# Patient Record
Sex: MALE
[De-identification: names, ages, dates, MRNs, and addresses within clinical notes are randomized per-mention and may not be internally consistent; named-entity substitution may affect disease eponyms.]

---

## 2020-03-01 ENCOUNTER — HOSPITAL ENCOUNTER (INPATIENT)
Dept: HOSPITAL 5 - ED | Age: 35
LOS: 2 days | Discharge: HOME | DRG: 638 | End: 2020-03-03
Attending: INTERNAL MEDICINE | Admitting: INTERNAL MEDICINE
Payer: SELF-PAY

## 2020-03-01 DIAGNOSIS — Z83.3: ICD-10-CM

## 2020-03-01 DIAGNOSIS — I10: ICD-10-CM

## 2020-03-01 DIAGNOSIS — E87.1: ICD-10-CM

## 2020-03-01 DIAGNOSIS — D75.1: ICD-10-CM

## 2020-03-01 DIAGNOSIS — R65.10: ICD-10-CM

## 2020-03-01 DIAGNOSIS — E11.10: Primary | ICD-10-CM

## 2020-03-01 DIAGNOSIS — D72.829: ICD-10-CM

## 2020-03-01 LAB
ALBUMIN SERPL-MCNC: 5.4 G/DL (ref 3.9–5)
ALT SERPL-CCNC: 37 UNITS/L (ref 7–56)
BAND NEUTROPHILS # (MANUAL): 0 K/MM3
BILIRUB DIRECT SERPL-MCNC: 0.3 MG/DL (ref 0–0.2)
BILIRUB UR QL STRIP: (no result)
BLOOD UR QL VISUAL: (no result)
BUN SERPL-MCNC: 20 MG/DL (ref 9–20)
BUN SERPL-MCNC: 22 MG/DL (ref 9–20)
BUN SERPL-MCNC: 22 MG/DL (ref 9–20)
BUN/CREAT SERPL: 15 %
BUN/CREAT SERPL: 22 %
BUN/CREAT SERPL: 22 %
CALCIUM SERPL-MCNC: 8.6 MG/DL (ref 8.4–10.2)
CALCIUM SERPL-MCNC: 8.9 MG/DL (ref 8.4–10.2)
CALCIUM SERPL-MCNC: 9.5 MG/DL (ref 8.4–10.2)
HCT VFR BLD CALC: 51.5 % (ref 35.5–45.6)
HEMOLYSIS INDEX: 14
HEMOLYSIS INDEX: 6
HEMOLYSIS INDEX: 9
HGB BLD-MCNC: 17.8 GM/DL (ref 11.8–15.2)
HYALINE CASTS #/AREA URNS LPF: 1 /LPF
MCHC RBC AUTO-ENTMCNC: 35 % (ref 32–34)
MCV RBC AUTO: 90 FL (ref 84–94)
MUCOUS THREADS #/AREA URNS HPF: (no result) /HPF
MYELOCYTES # (MANUAL): 0 K/MM3
PH UR STRIP: 5 [PH] (ref 5–7)
PLATELET # BLD: 420 K/MM3 (ref 140–440)
PROMYELOCYTES # (MANUAL): 0 K/MM3
RBC # BLD AUTO: 5.7 M/MM3 (ref 3.65–5.03)
RBC #/AREA URNS HPF: 2 /HPF (ref 0–6)
TOTAL CELLS COUNTED BLD: 100
UROBILINOGEN UR-MCNC: < 2 MG/DL (ref ?–2)
WBC #/AREA URNS HPF: 1 /HPF (ref 0–6)

## 2020-03-01 PROCEDURE — 85007 BL SMEAR W/DIFF WBC COUNT: CPT

## 2020-03-01 PROCEDURE — 85025 COMPLETE CBC W/AUTO DIFF WBC: CPT

## 2020-03-01 PROCEDURE — 83735 ASSAY OF MAGNESIUM: CPT

## 2020-03-01 PROCEDURE — 82805 BLOOD GASES W/O2 SATURATION: CPT

## 2020-03-01 PROCEDURE — 36415 COLL VENOUS BLD VENIPUNCTURE: CPT

## 2020-03-01 PROCEDURE — 82962 GLUCOSE BLOOD TEST: CPT

## 2020-03-01 PROCEDURE — 80076 HEPATIC FUNCTION PANEL: CPT

## 2020-03-01 PROCEDURE — 83690 ASSAY OF LIPASE: CPT

## 2020-03-01 PROCEDURE — 87040 BLOOD CULTURE FOR BACTERIA: CPT

## 2020-03-01 PROCEDURE — 84100 ASSAY OF PHOSPHORUS: CPT

## 2020-03-01 PROCEDURE — 80048 BASIC METABOLIC PNL TOTAL CA: CPT

## 2020-03-01 PROCEDURE — 93005 ELECTROCARDIOGRAM TRACING: CPT

## 2020-03-01 PROCEDURE — 81001 URINALYSIS AUTO W/SCOPE: CPT

## 2020-03-01 PROCEDURE — 93010 ELECTROCARDIOGRAM REPORT: CPT

## 2020-03-01 PROCEDURE — 82140 ASSAY OF AMMONIA: CPT

## 2020-03-01 PROCEDURE — 74177 CT ABD & PELVIS W/CONTRAST: CPT

## 2020-03-01 PROCEDURE — 83036 HEMOGLOBIN GLYCOSYLATED A1C: CPT

## 2020-03-01 RX ADMIN — INSULIN HUMAN SCH MLS/HR: 100 INJECTION, SOLUTION PARENTERAL at 22:00

## 2020-03-01 RX ADMIN — DEXTROSE AND SODIUM CHLORIDE SCH MLS/HR: 5; .9 INJECTION, SOLUTION INTRAVENOUS at 21:51

## 2020-03-01 RX ADMIN — Medication SCH ML: at 21:52

## 2020-03-01 RX ADMIN — FAMOTIDINE SCH MG: 10 INJECTION, SOLUTION INTRAVENOUS at 22:19

## 2020-03-01 NOTE — EVENT NOTE
ED Screening Note


Date of service: 03/01/20


Time: 16:30


ED Screening Note: 





c/o mid abdominal pain x yesterday


recent diagnosis of diabetes, not currently on medication


denies alcohol 





This initial assessment/diagnostic orders/clinical plan/treatment(s) is/are 

subject to change based on patients health status, clinical progression and re-

assessment by fellow clinical providers in the ED. Further treatment and workup 

at subsequent clinical providers discretion. Patient/guardian urged not to elope

from the ED as their condition may be serious if not clinically assessed and 

managed. 





Initial orders include: 


labs


CT

## 2020-03-01 NOTE — HISTORY AND PHYSICAL REPORT
History of Present Illness


Date of examination: 03/01/20


Date of admission: 


03/01/20 18:12





History of present illness: 





36-year-old male with a past medical history non-insulin-dependent diabetes 

presents to the hospital planing of hyperglycemia, nausea, vomiting, and 

generalized abdominal pain for last 3 days.  Patient has been diagnosed with 

diabetes 1 year ago and is from [place].  3 weeks ago he ran out of his 

previously prescribed medication from [place].  Yesterday he went to an urgent 

care clinic with complaints of nausea and vomiting.  He was told that his sugar 

was high and he had a UTI.  He was started on metformin 850 mg twice daily and 

an unknown antibiotic.  He was told if his nausea vomiting continued he would 

need to go to the ER.  Patient reports polyuria up until about 2 days ago..  

Denies previous history of DKA or ICU admission.  Patient complains of 

generalized burning pain with intermittent sharpness that is worse with 

palpation.  He denies diarrhea, fever, history of previous abd surgeries.





- Related Data


                                    Allergies











Allergy/AdvReac Type Severity Reaction Status Date / Time


 


No Known Allergies Allergy   Verified 03/01/20 16:22














 Review of Systems 


ROS: 


Stated complaint: STOMACH PAIN  VOMITING


Other details as noted in HPI





Comment: All other systems reviewed and negative








- Past Medical History


Previous Medical History?: Yes


Hx Diabetes: Yes





- Surgical History


Past Surgical History?: No





- Social History


Smoking Status: Never Smoker


Substance Use Type: None








Medications and Allergies


                                    Allergies











Allergy/AdvReac Type Severity Reaction Status Date / Time


 


No Known Allergies Allergy   Verified 03/01/20 16:22











                                Home Medications











 Medication  Instructions  Recorded  Confirmed  Last Taken  Type


 


metFORMIN [Glucophage] 850 mg PO 03/01/20  Unknown History











Active Meds: 


Active Medications





Dextrose (D50w (25gm) Syringe)  0 ml IV Q30MIN PRN; Protocol


   PRN Reason: Hypoglycemia


Insulin Human Regular 100 (units/ Sodium Chloride)  100 mls @ 1 mls/hr IV TITR 

KAREN; Protocol


   Last Titration: 03/01/20 20:15 Dose:  5 units/hr, 5 mls/hr


   Documented by: 











Exam





- Constitutional


Vitals: 


                                        











Temp Pulse Resp BP Pulse Ox


 


 98.7 F   106 H  18   120/60   99 


 


 03/01/20 16:31  03/01/20 19:41  03/01/20 19:41  03/01/20 19:41  03/01/20 19:41














Results





- Labs


CBC & Chem 7: 


                                 03/01/20 16:37





                                 03/01/20 20:09


Labs: 


                             Laboratory Last Values











WBC  15.7 K/mm3 (4.5-11.0)  H  03/01/20  16:37    


 


RBC  5.70 M/mm3 (3.65-5.03)  H  03/01/20  16:37    


 


Hgb  17.8 gm/dl (11.8-15.2)  H  03/01/20  16:37    


 


Hct  51.5 % (35.5-45.6)  H  03/01/20  16:37    


 


MCV  90 fl (84-94)   03/01/20  16:37    


 


MCH  31 pg (28-32)   03/01/20  16:37    


 


MCHC  35 % (32-34)  H  03/01/20  16:37    


 


RDW  13.3 % (13.2-15.2)   03/01/20  16:37    


 


Plt Count  420 K/mm3 (140-440)   03/01/20  16:37    


 


Add Manual Diff  Complete   03/01/20  16:37    


 


Total Counted  100   03/01/20  16:37    


 


Seg Neutrophils %  Np   03/01/20  16:37    


 


Seg Neuts % (Manual)  90.0 % (40.0-70.0)  H  03/01/20  16:37    


 


Band Neutrophils %  0 %  03/01/20  16:37    


 


Lymphocytes % (Manual)  10.0 % (13.4-35.0)  L  03/01/20  16:37    


 


Reactive Lymphs % (Man)  0 %  03/01/20  16:37    


 


Monocytes % (Manual)  0 % (0.0-7.3)   03/01/20  16:37    


 


Eosinophils % (Manual)  0 % (0.0-4.3)   03/01/20  16:37    


 


Basophils % (Manual)  0 % (0.0-1.8)   03/01/20  16:37    


 


Metamyelocytes %  0 %  03/01/20  16:37    


 


Myelocytes %  0 %  03/01/20  16:37    


 


Promyelocytes %  0 %  03/01/20  16:37    


 


Blast Cells %  0 %  03/01/20  16:37    


 


Nucleated RBC %  Not Reportable   03/01/20  16:37    


 


Seg Neutrophils # Man  14.1 K/mm3 (1.8-7.7)  H  03/01/20  16:37    


 


Band Neutrophils #  0.0 K/mm3  03/01/20  16:37    


 


Lymphocytes # (Manual)  1.6 K/mm3 (1.2-5.4)   03/01/20  16:37    


 


Abs React Lymphs (Man)  0.0 K/mm3  03/01/20  16:37    


 


Monocytes # (Manual)  0.0 K/mm3 (0.0-0.8)   03/01/20  16:37    


 


Eosinophils # (Manual)  0.0 K/mm3 (0.0-0.4)   03/01/20  16:37    


 


Basophils # (Manual)  0.0 K/mm3 (0.0-0.1)   03/01/20  16:37    


 


Metamyelocytes #  0.0 K/mm3  03/01/20  16:37    


 


Myelocytes #  0.0 K/mm3  03/01/20  16:37    


 


Promyelocytes #  0.0 K/mm3  03/01/20  16:37    


 


Blast Cells #  0.0 K/mm3  03/01/20  16:37    


 


WBC Morphology  Not Reportable   03/01/20  16:37    


 


Hypersegmented Neuts  Not Reportable   03/01/20  16:37    


 


Hyposegmented Neuts  Not Reportable   03/01/20  16:37    


 


Hypogranular Neuts  Not Reportable   03/01/20  16:37    


 


Smudge Cells  Not Reportable   03/01/20  16:37    


 


Toxic Granulation  Not Reportable   03/01/20  16:37    


 


Toxic Vacuolation  Not Reportable   03/01/20  16:37    


 


Dohle Bodies  Not Reportable   03/01/20  16:37    


 


Pelger-Huet Anomaly  Not Reportable   03/01/20  16:37    


 


Elizabet Rods  Not Reportable   03/01/20  16:37    


 


Platelet Estimate  Consistent w auto   03/01/20  16:37    


 


Clumped Platelets  Not Reportable   03/01/20  16:37    


 


Plt Clumps, EDTA  Not Reportable   03/01/20  16:37    


 


Large Platelets  1+   03/01/20  16:37    


 


Giant Platelets  Rare   03/01/20  16:37    


 


Platelet Satelliting  Not Reportable   03/01/20  16:37    


 


Plt Morphology Comment  Not Reportable   03/01/20  16:37    


 


RBC Morphology  Not Reportable   03/01/20  16:37    


 


Dimorphic RBCs  Not Reportable   03/01/20  16:37    


 


Polychromasia  Not Reportable   03/01/20  16:37    


 


Hypochromasia  Not Reportable   03/01/20  16:37    


 


Poikilocytosis  Not Reportable   03/01/20  16:37    


 


Anisocytosis  Few   03/01/20  16:37    


 


Microcytosis  Not Reportable   03/01/20  16:37    


 


Macrocytosis  Not Reportable   03/01/20  16:37    


 


Spherocytes  Not Reportable   03/01/20  16:37    


 


Pappenheimer Bodies  Not Reportable   03/01/20  16:37    


 


Sickle Cells  Not Reportable   03/01/20  16:37    


 


Target Cells  Not Reportable   03/01/20  16:37    


 


Tear Drop Cells  Not Reportable   03/01/20  16:37    


 


Ovalocytes  Not Reportable   03/01/20  16:37    


 


Stomatocytes  Few   03/01/20  16:37    


 


Helmet Cells  Not Reportable   03/01/20  16:37    


 


Bruce-Verplanck Bodies  Not Reportable   03/01/20  16:37    


 


Cabot Rings  Not Reportable   03/01/20  16:37    


 


Luigi Cells  Not Reportable   03/01/20  16:37    


 


Bite Cells  Not Reportable   03/01/20  16:37    


 


Crenated Cell  Not Reportable   03/01/20  16:37    


 


Elliptocytes  Not Reportable   03/01/20  16:37    


 


Acanthocytes (Spur)  Not Reportable   03/01/20  16:37    


 


Rouleaux  Not Reportable   03/01/20  16:37    


 


Hemoglobin C Crystals  Not Reportable   03/01/20  16:37    


 


Schistocytes  Not Reportable   03/01/20  16:37    


 


Malaria parasites  Not Reportable   03/01/20  16:37    


 


Kyaw Bodies  Not Reportable   03/01/20  16:37    


 


Hem Pathologist Commnt  No   03/01/20  16:37    


 


VBG pH  7.088  (7.320-7.420)  L*  03/01/20  16:37    


 


Sodium  131 mmol/L (137-145)  L  03/01/20  20:09    


 


Potassium  3.8 mmol/L (3.6-5.0)   03/01/20  20:09    


 


Chloride  96.7 mmol/L ()  L  03/01/20  20:09    


 


Carbon Dioxide  5 mmol/L (22-30)  L*  03/01/20  16:37    


 


Anion Gap  42 mmol/L  03/01/20  16:37    


 


BUN  22 mg/dL (9-20)  H  03/01/20  20:09    


 


Creatinine  1.0 mg/dL (0.8-1.5)   03/01/20  20:09    


 


Estimated GFR  > 60 ml/min  03/01/20  20:09    


 


BUN/Creatinine Ratio  22 %  03/01/20  20:09    


 


Glucose  352 mg/dL ()  H  03/01/20  20:09    


 


POC Glucose  273  ()  H  03/01/20  20:15    


 


Lactic Acid  2.60 mmol/L (0.7-2.0)  H*  03/01/20  Unknown


 


Calcium  8.9 mg/dL (8.4-10.2)   03/01/20  20:09    


 


Phosphorus  5.50 mg/dL (2.5-4.5)  H  03/01/20  Unknown


 


Magnesium  2.40 mg/dL (1.7-2.3)  H  03/01/20  Unknown


 


Total Bilirubin  0.80 mg/dL (0.1-1.2)   03/01/20  16:37    


 


Direct Bilirubin  0.3 mg/dL (0-0.2)  H  03/01/20  16:37    


 


Indirect Bilirubin  0.5 mg/dL  03/01/20  16:37    


 


AST  13 units/L (5-40)   03/01/20  16:37    


 


ALT  37 units/L (7-56)   03/01/20  16:37    


 


Alkaline Phosphatase  167 units/L ()  H  03/01/20  16:37    


 


Total Protein  8.7 g/dL (6.3-8.2)  H  03/01/20  16:37    


 


Albumin  5.4 g/dL (3.9-5)  H  03/01/20  16:37    


 


Albumin/Globulin Ratio  1.6 %  03/01/20  16:37    


 


Lipase  683 units/L (13-60)  H  03/01/20  16:37    


 


Urine Color  Straw  (Yellow)   03/01/20  20:00    


 


Urine Turbidity  Clear  (Clear)   03/01/20  20:00    


 


Urine pH  5.0  (5.0-7.0)   03/01/20  20:00    


 


Ur Specific Gravity  1.029  (1.003-1.030)   03/01/20  20:00    


 


Urine Protein  30 mg/dl mg/dL (Negative)   03/01/20  20:00    


 


Urine Glucose (UA)  >=500 mg/dL (Negative)   03/01/20  20:00    


 


Urine Ketones  80 mg/dL (Negative)   03/01/20  20:00    


 


Urine Blood  Sm  (Negative)   03/01/20  20:00    


 


Urine Nitrite  Neg  (Negative)   03/01/20  20:00    


 


Urine Bilirubin  Neg  (Negative)   03/01/20  20:00    


 


Urine Urobilinogen  < 2.0 mg/dL (<2.0)   03/01/20  20:00    


 


Ur Leukocyte Esterase  Neg  (Negative)   03/01/20  20:00    


 


Urine WBC (Auto)  1.0 /HPF (0.0-6.0)   03/01/20  20:00    


 


Urine RBC (Auto)  2.0 /HPF (0.0-6.0)   03/01/20  20:00    


 


Hyaline Casts  1 /LPF  03/01/20  20:00    


 


Urine Mucus  Few /HPF  03/01/20  20:00

## 2020-03-01 NOTE — CAT SCAN REPORT
CT ABDOMEN AND PELVIS WITH IV CONTRAST



INDICATION: Generalized abdominal pain and new onset diabetes. 



TECHNIQUE: Following the administration of intravenous contrast, multiple axial CT images of the abdo
men and pelvis were acquired. Sagittal and coronal reformats were obtained.  All CT performed at this
 facility utilize dose reduction techniques including automated exposure control, iterative reconstru
ction and weight based dosing when appropriate to reduce patient radiation dose to as low as reasonab
ly achievable.  



COMPARISON: None



FINDINGS:

Limited imaging of the bilateral lung bases demonstrates no acute abnormality.



Abdomen: There is moderate fluid-filled distention of the stomach. The liver, spleen, pancreas, bilat
eral adrenal glands and bilateral kidneys show no evidence of acute abnormality. There is no evidence
 of bowel obstruction or free fluid. The appendix is visualized and appears normal.



Pelvis: No free fluid is seen within the pelvis. The urinary bladder appears normal.



Bones and Soft Tissues: No significant abnormality.



IMPRESSION:

1. Moderate fluid-filled distention of the stomach which is nonspecific but may suggest gastritis.



Signer Name: Miley Watts MD 

Signed: 3/1/2020 5:59 PM

Workstation Name: [id number]

## 2020-03-01 NOTE — EMERGENCY DEPARTMENT REPORT
ED Abdominal Pain HPI





- General


Chief Complaint: Abdominal Pain


Stated Complaint: STOMACH PAIN  VOMITING


Time Seen by Provider: 03/01/20 16:27


Source: patient


Mode of arrival: Ambulatory


Limitations: No Limitations





- History of Present Illness


Initial Comments: 





36-year-old male with a past medical history non-insulin-dependent diabetes 

presents to the hospital planing of hyperglycemia, nausea, vomiting, and 

generalized abdominal pain for last 3 days.  Patient has been diagnosed with 

diabetes 1 year ago and is from [place].  3 weeks ago he ran out of his 

previously prescribed medication from [place].  Yesterday he went to an urgent 

care clinic with complaints of nausea and vomiting.  He was told that his sugar 

was high and he had a UTI.  He was started on metformin 850 mg twice daily and 

an unknown antibiotic.  He was told if his nausea vomiting continued he would 

need to go to the ER.  Patient reports polyuria up until about 2 days ago..  

Denies previous history of DKA or ICU admission.  Patient complains of 

generalized burning pain with intermittent sharpness that is worse with 

palpation.  He denies diarrhea, fever, history of previous abd surgeries.





- Related Data


                                Home Medications











 Medication  Instructions  Recorded  Confirmed  Last Taken


 


Lisinopril [Zestril] 5 mg PO ONCE 03/01/20 03/01/20 Unknown


 


metFORMIN [Glucophage] 850 mg PO ONCE 03/01/20 03/01/20 Unknown











                                    Allergies











Allergy/AdvReac Type Severity Reaction Status Date / Time


 


No Known Allergies Allergy   Verified 03/01/20 16:22














ED Review of Systems


ROS: 


Stated complaint: STOMACH PAIN  VOMITING


Other details as noted in HPI





Comment: All other systems reviewed and negative





ED Past Medical Hx





- Past Medical History


Previous Medical History?: Yes


Hx Diabetes: Yes





- Surgical History


Past Surgical History?: No





- Social History


Smoking Status: Never Smoker


Substance Use Type: None





- Medications


Home Medications: 


                                Home Medications











 Medication  Instructions  Recorded  Confirmed  Last Taken  Type


 


Lisinopril [Zestril] 5 mg PO ONCE 03/01/20 03/01/20 Unknown History


 


metFORMIN [Glucophage] 850 mg PO ONCE 03/01/20 03/01/20 Unknown History














ED Physical Exam





- General


Limitations: No Limitations





- Other


Other exam information: 





General: No acute distress


Head: Atraumatic


Eyes: normal appearance


ENT: Dry mucous membrane


Neck: Normal appearance, no midline tenderness


Chest: Clear to auscultation bilaterally


CV: Tachycardic regular rhythm


Abdomen: Soft, normal bowel sounds, right lower quadrant and epigastric 

tenderness, no rebound or guarding


Back: Normal inspection


Extremity: Normal inspection, full range of motion


Neuro: Alert O x 3, no facial asymmetry, speech clear, no gross motor sensory 

deficit


Psych: Appropriate behavior


Skin: No rash





ED Course


                                   Vital Signs











  03/01/20 03/01/20 03/01/20





  16:31 17:30 18:09


 


Temperature 98.7 F  


 


Pulse Rate 137 H 107 H 105 H


 


Respiratory 18 24 24





Rate   


 


Blood Pressure 145/86  


 


Blood Pressure  129/75 148/69





[Right]   


 


O2 Sat by Pulse 100 99 98





Oximetry   














  03/01/20





  19:41


 


Temperature 


 


Pulse Rate 106 H


 


Respiratory 18





Rate 


 


Blood Pressure 


 


Blood Pressure 120/60





[Right] 


 


O2 Sat by Pulse 99





Oximetry 














ED Medical Decision Making





- Lab Data


Result diagrams: 


                                 03/01/20 16:37





                                 03/01/20 21:19








                                   Lab Results











  03/01/20 03/01/20 03/01/20 Range/Units





  16:37 16:37 16:37 


 


WBC   15.7 H   (4.5-11.0)  K/mm3


 


RBC   5.70 H   (3.65-5.03)  M/mm3


 


Hgb   17.8 H   (11.8-15.2)  gm/dl


 


Hct   51.5 H   (35.5-45.6)  %


 


MCV   90   (84-94)  fl


 


MCH   31   (28-32)  pg


 


MCHC   35 H   (32-34)  %


 


RDW   13.3   (13.2-15.2)  %


 


Plt Count   420   (140-440)  K/mm3


 


Add Manual Diff   Complete   


 


Total Counted   100   


 


Seg Neutrophils %   Np   


 


Seg Neuts % (Manual)   90.0 H   (40.0-70.0)  %


 


Band Neutrophils %   0   %


 


Lymphocytes % (Manual)   10.0 L   (13.4-35.0)  %


 


Reactive Lymphs % (Man)   0   %


 


Monocytes % (Manual)   0   (0.0-7.3)  %


 


Eosinophils % (Manual)   0   (0.0-4.3)  %


 


Basophils % (Manual)   0   (0.0-1.8)  %


 


Metamyelocytes %   0   %


 


Myelocytes %   0   %


 


Promyelocytes %   0   %


 


Blast Cells %   0   %


 


Nucleated RBC %   Not Reportable   


 


Seg Neutrophils # Man   14.1 H   (1.8-7.7)  K/mm3


 


Band Neutrophils #   0.0   K/mm3


 


Lymphocytes # (Manual)   1.6   (1.2-5.4)  K/mm3


 


Abs React Lymphs (Man)   0.0   K/mm3


 


Monocytes # (Manual)   0.0   (0.0-0.8)  K/mm3


 


Eosinophils # (Manual)   0.0   (0.0-0.4)  K/mm3


 


Basophils # (Manual)   0.0   (0.0-0.1)  K/mm3


 


Metamyelocytes #   0.0   K/mm3


 


Myelocytes #   0.0   K/mm3


 


Promyelocytes #   0.0   K/mm3


 


Blast Cells #   0.0   K/mm3


 


WBC Morphology   Not Reportable   


 


Hypersegmented Neuts   Not Reportable   


 


Hyposegmented Neuts   Not Reportable   


 


Hypogranular Neuts   Not Reportable   


 


Smudge Cells   Not Reportable   


 


Toxic Granulation   Not Reportable   


 


Toxic Vacuolation   Not Reportable   


 


Dohle Bodies   Not Reportable   


 


Pelger-Huet Anomaly   Not Reportable   


 


Eliz[place]et Rods   Not Reportable   


 


Platelet Estimate   Consistent w auto   


 


Clumped Platelets   Not Reportable   


 


Plt Clumps, EDTA   Not Reportable   


 


Large Platelets   1+   


 


Giant Platelets   Rare   


 


Platelet Satelliting   Not Reportable   


 


Plt Morphology Comment   Not Reportable   


 


RBC Morphology   Not Reportable   


 


Dimorphic RBCs   Not Reportable   


 


Polychromasia   Not Reportable   


 


Hypochromasia   Not Reportable   


 


Poikilocytosis   Not Reportable   


 


Anisocytosis   Few   


 


Microcytosis   Not Reportable   


 


Macrocytosis   Not Reportable   


 


Spherocytes   Not Reportable   


 


Pappenheimer Bodies   Not Reportable   


 


Sickle Cells   Not Reportable   


 


Target Cells   Not Reportable   


 


Tear Drop Cells   Not Reportable   


 


Ovalocytes   Not Reportable   


 


Stomatocytes   Few   


 


Helmet Cells   Not Reportable   


 


[place] Bodies   Not Reportable   


 


Cabot Rings   Not Reportable   


 


Luigi Cells   Not Reportable   


 


Bite Cells   Not Reportable   


 


Crenated Cell   Not Reportable   


 


Elliptocytes   Not Reportable   


 


Acanthocytes (Spur)   Not Reportable   


 


Rouleaux   Not Reportable   


 


Hemoglobin C Crystals   Not Reportable   


 


Schistocytes   Not Reportable   


 


Malaria parasites   Not Reportable   


 


[place] Bodies   Not Reportable   


 


Hem Pathologist Commnt   No   


 


VBG pH     (7.320-7.420)  


 


Sodium    127 L  (137-145)  mmol/L


 


Potassium    4.2  (3.6-5.0)  mmol/L


 


Chloride    84.0 L  ()  mmol/L


 


Carbon Dioxide    5 L*  (22-30)  mmol/L


 


Anion Gap    42  mmol/L


 


BUN    22 H  (9-20)  mg/dL


 


Creatinine    1.5  (0.8-1.5)  mg/dL


 


Estimated GFR    54  ml/min


 


BUN/Creatinine Ratio    15  %


 


Glucose    530 H*  ()  mg/dL


 


POC Glucose  426 H    ()  


 


Lactic Acid     (0.7-2.0)  mmol/L


 


Calcium    9.5  (8.4-10.2)  mg/dL


 


Phosphorus     (2.5-4.5)  mg/dL


 


Magnesium     (1.7-2.3)  mg/dL


 


Total Bilirubin    0.80  (0.1-1.2)  mg/dL


 


Direct Bilirubin    0.3 H  (0-0.2)  mg/dL


 


Indirect Bilirubin    0.5  mg/dL


 


AST    13  (5-40)  units/L


 


ALT    37  (7-56)  units/L


 


Alkaline Phosphatase    167 H  ()  units/L


 


Total Protein    8.7 H  (6.3-8.2)  g/dL


 


Albumin    5.4 H  (3.9-5)  g/dL


 


Albumin/Globulin Ratio    1.6  %


 


Lipase     (13-60)  units/L














  03/01/20 03/01/20 03/01/20 Range/Units





  16:37 16:37 Unknown 


 


WBC     (4.5-11.0)  K/mm3


 


RBC     (3.65-5.03)  M/mm3


 


Hgb     (11.8-15.2)  gm/dl


 


Hct     (35.5-45.6)  %


 


MCV     (84-94)  fl


 


MCH     (28-32)  pg


 


MCHC     (32-34)  %


 


RDW     (13.2-15.2)  %


 


Plt Count     (140-440)  K/mm3


 


Add Manual Diff     


 


Total Counted     


 


Seg Neutrophils %     


 


Seg Neuts % (Manual)     (40.0-70.0)  %


 


Band Neutrophils %     %


 


Lymphocytes % (Manual)     (13.4-35.0)  %


 


Reactive Lymphs % (Man)     %


 


Monocytes % (Manual)     (0.0-7.3)  %


 


Eosinophils % (Manual)     (0.0-4.3)  %


 


Basophils % (Manual)     (0.0-1.8)  %


 


Metamyelocytes %     %


 


Myelocytes %     %


 


Promyelocytes %     %


 


Blast Cells %     %


 


Nucleated RBC %     


 


Seg Neutrophils # Man     (1.8-7.7)  K/mm3


 


Band Neutrophils #     K/mm3


 


Lymphocytes # (Manual)     (1.2-5.4)  K/mm3


 


Abs React Lymphs (Man)     K/mm3


 


Monocytes # (Manual)     (0.0-0.8)  K/mm3


 


Eosinophils # (Manual)     (0.0-0.4)  K/mm3


 


Basophils # (Manual)     (0.0-0.1)  K/mm3


 


Metamyelocytes #     K/mm3


 


Myelocytes #     K/mm3


 


Promyelocytes #     K/mm3


 


Blast Cells #     K/mm3


 


WBC Morphology     


 


Hypersegmented Neuts     


 


Hyposegmented Neuts     


 


Hypogranular Neuts     


 


Smudge Cells     


 


Toxic Granulation     


 


Toxic Vacuolation     


 


Dohle Bodies     


 


Pelger-Huet Anomaly     


 


Elizabet Rods     


 


Platelet Estimate     


 


Clumped Platelets     


 


Plt Clumps, EDTA     


 


Large Platelets     


 


Giant Platelets     


 


Platelet Satelliting     


 


Plt Morphology Comment     


 


RBC Morphology     


 


Dimorphic RBCs     


 


Polychromasia     


 


Hypochromasia     


 


Poikilocytosis     


 


Anisocytosis     


 


Microcytosis     


 


Macrocytosis     


 


Spherocytes     


 


Pappenheimer Bodies     


 


Sickle Cells     


 


Target Cells     


 


Tear Drop Cells     


 


Ovalocytes     


 


Stomatocytes     


 


Helmet Cells     


 


[place] Bodies     


 


Cabot Rings     


 


Luigi Cells     


 


Bite Cells     


 


Crenated Cell     


 


Elliptocytes     


 


Acanthocytes (Spur)     


 


Rouleaux     


 


Hemoglobin C Crystals     


 


Schistocytes     


 


Malaria parasites     


 


Kyaw Bodies     


 


Hem Pathologist Commnt     


 


VBG pH  7.088 L*    (7.320-7.420)  


 


Sodium     (137-145)  mmol/L


 


Potassium     (3.6-5.0)  mmol/L


 


Chloride     ()  mmol/L


 


Carbon Dioxide     (22-30)  mmol/L


 


Anion Gap     mmol/L


 


BUN     (9-20)  mg/dL


 


Creatinine     (0.8-1.5)  mg/dL


 


Estimated GFR     ml/min


 


BUN/Creatinine Ratio     %


 


Glucose     ()  mg/dL


 


POC Glucose     ()  


 


Lactic Acid    2.60 H*  (0.7-2.0)  mmol/L


 


Calcium     (8.4-10.2)  mg/dL


 


Phosphorus     (2.5-4.5)  mg/dL


 


Magnesium     (1.7-2.3)  mg/dL


 


Total Bilirubin     (0.1-1.2)  mg/dL


 


Direct Bilirubin     (0-0.2)  mg/dL


 


Indirect Bilirubin     mg/dL


 


AST     (5-40)  units/L


 


ALT     (7-56)  units/L


 


Alkaline Phosphatase     ()  units/L


 


Total Protein     (6.3-8.2)  g/dL


 


Albumin     (3.9-5)  g/dL


 


Albumin/Globulin Ratio     %


 


Lipase   683 H   (13-60)  units/L














  03/01/20 Range/Units





  Unknown 


 


WBC   (4.5-11.0)  K/mm3


 


RBC   (3.65-5.03)  M/mm3


 


Hgb   (11.8-15.2)  gm/dl


 


Hct   (35.5-45.6)  %


 


MCV   (84-94)  fl


 


MCH   (28-32)  pg


 


MCHC   (32-34)  %


 


RDW   (13.2-15.2)  %


 


Plt Count   (140-440)  K/mm3


 


Add Manual Diff   


 


Total Counted   


 


Seg Neutrophils %   


 


Seg Neuts % (Manual)   (40.0-70.0)  %


 


Band Neutrophils %   %


 


Lymphocytes % (Manual)   (13.4-35.0)  %


 


Reactive Lymphs % (Man)   %


 


Monocytes % (Manual)   (0.0-7.3)  %


 


Eosinophils % (Manual)   (0.0-4.3)  %


 


Basophils % (Manual)   (0.0-1.8)  %


 


Metamyelocytes %   %


 


Myelocytes %   %


 


Promyelocytes %   %


 


Blast Cells %   %


 


Nucleated RBC %   


 


Seg Neutrophils # Man   (1.8-7.7)  K/mm3


 


Band Neutrophils #   K/mm3


 


Lymphocytes # (Manual)   (1.2-5.4)  K/mm3


 


Abs React Lymphs (Man)   K/mm3


 


Monocytes # (Manual)   (0.0-0.8)  K/mm3


 


Eosinophils # (Manual)   (0.0-0.4)  K/mm3


 


Basophils # (Manual)   (0.0-0.1)  K/mm3


 


Metamyelocytes #   K/mm3


 


Myelocytes #   K/mm3


 


Promyelocytes #   K/mm3


 


Blast Cells #   K/mm3


 


WBC Morphology   


 


Hypersegmented Neuts   


 


Hyposegmented Neuts   


 


Hypogranular Neuts   


 


Smudge Cells   


 


Toxic Granulation   


 


Toxic Vacuolation   


 


Dohle Bodies   


 


Pelger-Huet Anomaly   


 


Elizabet Rods   


 


Platelet Estimate   


 


Clumped Platelets   


 


Plt Clumps, EDTA   


 


Large Platelets   


 


Giant Platelets   


 


Platelet Satelliting   


 


Plt Morphology Comment   


 


RBC Morphology   


 


Dimorphic RBCs   


 


Polychromasia   


 


Hypochromasia   


 


Poikilocytosis   


 


Anisocytosis   


 


Microcytosis   


 


Macrocytosis   


 


Spherocytes   


 


Pappenheimer Bodies   


 


Sickle Cells   


 


Target Cells   


 


Tear Drop Cells   


 


Ovalocytes   


 


Stomatocytes   


 


Helmet Cells   


 


[place] Bodies   


 


Cabot Rings   


 


[place] Cells   


 


Bite Cells   


 


Crenated Cell   


 


Elliptocytes   


 


Acanthocytes (Spur)   


 


Rouleaux   


 


Hemoglobin C Crystals   


 


Schistocytes   


 


Malaria parasites   


 


[place] Bodies   


 


Hem Pathologist Commnt   


 


VBG pH   (7.320-7.420)  


 


Sodium   (137-145)  mmol/L


 


Potassium   (3.6-5.0)  mmol/L


 


Chloride   ()  mmol/L


 


Carbon Dioxide   (22-30)  mmol/L


 


Anion Gap   mmol/L


 


BUN   (9-20)  mg/dL


 


Creatinine   (0.8-1.5)  mg/dL


 


Estimated GFR   ml/min


 


BUN/Creatinine Ratio   %


 


Glucose   ()  mg/dL


 


POC Glucose   ()  


 


Lactic Acid   (0.7-2.0)  mmol/L


 


Calcium   (8.4-10.2)  mg/dL


 


Phosphorus  5.50 H  (2.5-4.5)  mg/dL


 


Magnesium  2.40 H  (1.7-2.3)  mg/dL


 


Total Bilirubin   (0.1-1.2)  mg/dL


 


Direct Bilirubin   (0-0.2)  mg/dL


 


Indirect Bilirubin   mg/dL


 


AST   (5-40)  units/L


 


ALT   (7-56)  units/L


 


Alkaline Phosphatase   ()  units/L


 


Total Protein   (6.3-8.2)  g/dL


 


Albumin   (3.9-5)  g/dL


 


Albumin/Globulin Ratio   %


 


Lipase   (13-60)  units/L














- EKG Data


-: EKG Interpreted by Me


EKG shows normal: sinus rhythm, intervals (qtc 489)


Rate: tachycardia (112)





- Radiology Data


Radiology results: report reviewed





CT ABDOMEN AND PELVIS WITH IV CONTRAST INDICATION: Generalized abdominal pain 

and new onset diabetes. TECHNIQUE: Following the administration of intravenous 

contrast, multiple axial CT images of the abdomen and pelvis were acquired. 

Sagittal and coronal reformats were obtained. All CT performed at this facility 

utilize dose reduction techniques including automated exposure control, 

iterative reconstruction and weight based dosing when appropriate to reduce 

patient radiation dose to as low as reasonably achievable. COMPARISON: None 

FINDINGS: Limited imaging of the bilateral lung bases demonstrates no acute ab

normality. Abdomen: There is moderate fluid-filled distention of the stomach. 

The liver, spleen, pancreas, bilateral adrenal glands and bilateral kidneys show

 no evidence of acute abnormality. There is no evidence of bowel obstruction or 

free fluid. The appendix is visualized and appears normal. Pelvis: No free fluid

 is seen within the pelvis. The urinary bladder appears normal. Bones and Soft 

Tissues: No significant abnormality. IMPRESSION: 1. Moderate fluid-filled 

distention of the stomach which is nonspecific but may suggest gastritis. 





- Medical Decision Making





Patient presents with signs and symptoms of DKA with significant anion gap 

metabolic acidosis and nausea vomiting.  CT and pelvis suggestive of gastritis. 

 Patient treated with normal saline, insulin drip, Zofran, Pepcid, and morphine 

in the ED.  At time of disposition awaiting urine collection to determine if IV 

antibiotics is indicated.





Case discussed with hospitalist for admission and ICU orders placed





UA neg for infection





- Differential Diagnosis


DKA, HHNK, pancreatitis, dehydration, sepsis, UTI


Critical Care Time: Yes


Critical care time in (mins) excluding proc time.: 35


Critical care attestation.: 


If time is entered above; I have spent that time in minutes in the direct care 

of this critically ill patient, excluding procedure time.








ED Disposition


Clinical Impression: 


 DKA (diabetic ketoacidoses)





Disposition: DC-09 OP ADMIT IP TO THIS HOSP


Is pt being admited?: Yes


Condition: Stable


Time of Disposition: 18:09 (Dr Brock/hosp)

## 2020-03-02 LAB
BUN SERPL-MCNC: 15 MG/DL (ref 9–20)
BUN SERPL-MCNC: 17 MG/DL (ref 9–20)
BUN SERPL-MCNC: 19 MG/DL (ref 9–20)
BUN/CREAT SERPL: 21 %
BUN/CREAT SERPL: 24 %
CALCIUM SERPL-MCNC: 8.7 MG/DL (ref 8.4–10.2)
CALCIUM SERPL-MCNC: 8.9 MG/DL (ref 8.4–10.2)
CALCIUM SERPL-MCNC: 8.9 MG/DL (ref 8.4–10.2)
HEMOLYSIS INDEX: 10
HEMOLYSIS INDEX: 10
HEMOLYSIS INDEX: 11
HEMOLYSIS INDEX: 3
HEMOLYSIS INDEX: 5
HEMOLYSIS INDEX: 7

## 2020-03-02 RX ADMIN — FAMOTIDINE SCH MG: 10 INJECTION, SOLUTION INTRAVENOUS at 21:52

## 2020-03-02 RX ADMIN — POTASSIUM CHLORIDE SCH MLS/HR: 10 INJECTION, SOLUTION INTRAVENOUS at 13:36

## 2020-03-02 RX ADMIN — INSULIN HUMAN SCH MLS/HR: 100 INJECTION, SOLUTION PARENTERAL at 08:36

## 2020-03-02 RX ADMIN — FAMOTIDINE SCH MG: 10 INJECTION, SOLUTION INTRAVENOUS at 09:17

## 2020-03-02 RX ADMIN — POTASSIUM CHLORIDE SCH MLS/HR: 10 INJECTION, SOLUTION INTRAVENOUS at 12:06

## 2020-03-02 RX ADMIN — INSULIN LISPRO SCH UNIT: 100 INJECTION, SOLUTION INTRAVENOUS; SUBCUTANEOUS at 16:09

## 2020-03-02 RX ADMIN — INSULIN HUMAN SCH UNIT: 100 INJECTION, SUSPENSION SUBCUTANEOUS at 16:05

## 2020-03-02 RX ADMIN — Medication SCH ML: at 21:48

## 2020-03-02 RX ADMIN — DEXTROSE AND SODIUM CHLORIDE SCH MLS/HR: 5; .9 INJECTION, SOLUTION INTRAVENOUS at 04:06

## 2020-03-02 RX ADMIN — POTASSIUM CHLORIDE SCH MLS/HR: 10 INJECTION, SOLUTION INTRAVENOUS at 09:28

## 2020-03-02 RX ADMIN — INSULIN LISPRO SCH UNIT: 100 INJECTION, SOLUTION INTRAVENOUS; SUBCUTANEOUS at 21:48

## 2020-03-02 RX ADMIN — POTASSIUM CHLORIDE SCH MLS/HR: 10 INJECTION, SOLUTION INTRAVENOUS at 10:53

## 2020-03-02 RX ADMIN — Medication SCH ML: at 09:19

## 2020-03-02 NOTE — HISTORY AND PHYSICAL REPORT
CHIEF COMPLAINT:  Nausea, vomiting, and abdominal pain for 3 days.



HISTORY OF PRESENT ILLNESS:  A 35-year-old male with history of diabetes, on

metformin, comes in for nausea, vomiting, and abdominal pain for 3 days.  The

patient stopped taking metformin for 3 weeks because he ran out of his medicine.

 The patient went to urgent care clinic with nausea and vomiting yesterday.  He

was told that his sugar was high, and he had a urinary tract infection.  The

patient is on 850 mg of metformin twice a day.  Not on any sulfonylureas or

insulin.  The patient continued to have vomiting because of which the patient

came to the Emergency Room.  In the Emergency Room, the patient's bicarbonate

was very low in the range of 5 and also pH of 7.088 and blood glucose level of

530, hence admission to ICU as DKA.



PAST MEDICAL HISTORY:  Hypertension and type 2 diabetes.



MEDICATIONS:  On metformin.



PAST SURGICAL HISTORY:  None.



SOCIAL HISTORY:  Does not smoke.  No alcohol, no recreational drugs.



FAMILY HISTORY:  Diabetes.



REVIEW OF SYSTEMS:  Significant for nausea, vomiting, abdominal pain for 3 days.

 Generalized weakness for 3 days.  Polyuria for 1 week.  Otherwise, review of

systems negative.



PHYSICAL EXAMINATION:

GENERAL:  Young male, cooperative during examination.

VITAL SIGNS:  Blood pressure is 137/78, temperature is 98.5, pulse is 81,

respirations are 22, sats are 98%.

HEENT:  Dry mucous membranes.

NECK:  Supple, no lymphadenopathy, no thyromegaly.

LUNGS:  Clear to auscultation and percussion.  Good air entry.

CARDIOVASCULAR SYSTEM:  S1, S2 heard.  No gallop, no murmur, no rub.  Apical

impulse in left fifth intercostal space and midclavicular line.

ABDOMEN:  Soft and benign.  No hepatosplenomegaly, no guarding, no rigidity. 

Hernial orifices are normal.

EXTREMITIES:  Good pedal pulses.  No pedal edema.

CENTRAL NERVOUS SYSTEM:  Alert and oriented x4, nonfocal exam.

SKIN:  Normal.



DIAGNOSTIC DATA:  CT of the abdomen shows moderate fluid-filled distention ____

nonspecific, may suggest acute gastritis.



LABORATORY DATA:  Significant for white count of 15,700, H and H of 17.8 and

51.5, platelet count of 420,000.  ABG:  pH of 7.088.  Sodium of 127, potassium

of 4.2, chloride of 84, bicarbonate of 5.  BUN and creatinine of 22 and 1.5,

glucose of 530.  A1c of 11.1, magnesium of 2.2, direct bilirubin of 0.3,

alkaline phosphatase of 167, total protein 8.7, albumin is 5.4.  Lipase is 683. 

Urine shows ketones of 80, glucose of more than 500 and specific gravity of

1.029.



ASSESSMENT AND PLAN:

1.  Diabetic ketoacidosis.  The patient is initiated on DKA protocol.  The

patient may need to be discharged on insulin.  Novolin mix 70/30, 10 units twice

a day initiated.

2.  Metabolic acidosis, severe.  Anion gap is 42.  IV fluids, IV insulin, Zofran

and Reglan for now.  Monitor electrolytes.

3.  Type 2 diabetes, uncontrolled.  A1c 11.1, needs to be on insulin at the time

of discharge.

4.  Polycythemia secondary to hemoconcentration.  IV fluids for now.

5.  Leukocytosis.  Empiric antibiotics for now.  Urine negative for infection.

6.  Deep venous thrombosis prophylaxis, heparin 5000 q. 12.



Critical care time 32 minutes.  The patient counseled about diabetes.  Language

barrier present.





DD: 03/02/2020 

DT: 03/02/2020 

JOB# [id number]  [id number]

SHANDA/LYNETTE ARANGO

## 2020-03-02 NOTE — PROGRESS NOTE
Assessment and Plan


Assessment and plan: 





DKA


-Patient admitted to ICU and is on DKA protocol


-We will continue current management


-Gap is now closed, keep him n.p.o.


-We will transition him to sliding scale and basal insulin once he is out of DKA


-A1c is 11.1, nutrition consult for diabetic education


-Patient need insulin at discharge


SIRS likely noninfectious


-Patient was empirically started with Rocephin, will discontinue once WBC is 

trended down


-WBC still high, UA is negative, chest x-ray is negative


DVT prophylaxis


-On Lovenox


Disposition


-Continue ICU care








History


Interval history: 





Patient was seen and evaluated this morning, patient was alert and oriented.  

Complaints pain at the IV site.





Hospitalist Physical





- Physical exam


Narrative exam: 





 Not in cardiopulmonary distress. 


 The patient appeared well nourished and normally developed.


 Vital signs as documented.


 Head exam is unremarkable.


 No scleral icterus .


 Neck is without jugular venous distension, thyromegaly, or carotid bruits. 


 Lungs are clear to auscultation.


Cardiac exam reveals regular rate and  Rhythm. 


Abdominal exam reveals normal bowel sounds, nontender, no organomegaly.


Extremities are nonedematous and both femoral and pedal pulses are normal.


CNS: Alert and oriented 3.  No focal weakness.





- Constitutional


Vitals: 


                                        











Temp Pulse Resp BP Pulse Ox


 


 98.2 F   79   18   150/73   100 


 


 03/02/20 08:00  03/02/20 06:00  03/02/20 06:00  03/02/20 06:00  03/02/20 06:00














Results





- Labs


CBC & Chem 7: 


                                 03/01/20 16:37





                                 03/02/20 10:00


Labs: 


                             Laboratory Last Values











WBC  15.7 K/mm3 (4.5-11.0)  H  03/01/20  16:37    


 


RBC  5.70 M/mm3 (3.65-5.03)  H  03/01/20  16:37    


 


Hgb  17.8 gm/dl (11.8-15.2)  H  03/01/20  16:37    


 


Hct  51.5 % (35.5-45.6)  H  03/01/20  16:37    


 


MCV  90 fl (84-94)   03/01/20  16:37    


 


MCH  31 pg (28-32)   03/01/20  16:37    


 


MCHC  35 % (32-34)  H  03/01/20  16:37    


 


RDW  13.3 % (13.2-15.2)   03/01/20  16:37    


 


Plt Count  420 K/mm3 (140-440)   03/01/20  16:37    


 


Add Manual Diff  Complete   03/01/20  16:37    


 


Total Counted  100   03/01/20  16:37    


 


Seg Neutrophils %  Np   03/01/20  16:37    


 


Seg Neuts % (Manual)  90.0 % (40.0-70.0)  H  03/01/20  16:37    


 


Band Neutrophils %  0 %  03/01/20  16:37    


 


Lymphocytes % (Manual)  10.0 % (13.4-35.0)  L  03/01/20  16:37    


 


Reactive Lymphs % (Man)  0 %  03/01/20  16:37    


 


Monocytes % (Manual)  0 % (0.0-7.3)   03/01/20  16:37    


 


Eosinophils % (Manual)  0 % (0.0-4.3)   03/01/20  16:37    


 


Basophils % (Manual)  0 % (0.0-1.8)   03/01/20  16:37    


 


Metamyelocytes %  0 %  03/01/20  16:37    


 


Myelocytes %  0 %  03/01/20  16:37    


 


Promyelocytes %  0 %  03/01/20  16:37    


 


Blast Cells %  0 %  03/01/20  16:37    


 


Nucleated RBC %  Not Reportable   03/01/20  16:37    


 


Seg Neutrophils # Man  14.1 K/mm3 (1.8-7.7)  H  03/01/20  16:37    


 


Band Neutrophils #  0.0 K/mm3  03/01/20  16:37    


 


Lymphocytes # (Manual)  1.6 K/mm3 (1.2-5.4)   03/01/20  16:37    


 


Abs React Lymphs (Man)  0.0 K/mm3  03/01/20  16:37    


 


Monocytes # (Manual)  0.0 K/mm3 (0.0-0.8)   03/01/20  16:37    


 


Eosinophils # (Manual)  0.0 K/mm3 (0.0-0.4)   03/01/20  16:37    


 


Basophils # (Manual)  0.0 K/mm3 (0.0-0.1)   03/01/20  16:37    


 


Metamyelocytes #  0.0 K/mm3  03/01/20  16:37    


 


Myelocytes #  0.0 K/mm3  03/01/20  16:37    


 


Promyelocytes #  0.0 K/mm3  03/01/20  16:37    


 


Blast Cells #  0.0 K/mm3  03/01/20  16:37    


 


WBC Morphology  Not Reportable   03/01/20  16:37    


 


Hypersegmented Neuts  Not Reportable   03/01/20  16:37    


 


Hyposegmented Neuts  Not Reportable   03/01/20  16:37    


 


Hypogranular Neuts  Not Reportable   03/01/20  16:37    


 


Smudge Cells  Not Reportable   03/01/20  16:37    


 


Toxic Granulation  Not Reportable   03/01/20  16:37    


 


Toxic Vacuolation  Not Reportable   03/01/20  16:37    


 


Dohle Bodies  Not Reportable   03/01/20  16:37    


 


Pelger-Huet Anomaly  Not Reportable   03/01/20  16:37    


 


Elizabet Rods  Not Reportable   03/01/20  16:37    


 


Platelet Estimate  Consistent w auto   03/01/20  16:37    


 


Clumped Platelets  Not Reportable   03/01/20  16:37    


 


Plt Clumps, EDTA  Not Reportable   03/01/20  16:37    


 


Large Platelets  1+   03/01/20  16:37    


 


Giant Platelets  Rare   03/01/20  16:37    


 


Platelet Satelliting  Not Reportable   03/01/20  16:37    


 


Plt Morphology Comment  Not Reportable   03/01/20  16:37    


 


RBC Morphology  Not Reportable   03/01/20  16:37    


 


Dimorphic RBCs  Not Reportable   03/01/20  16:37    


 


Polychromasia  Not Reportable   03/01/20  16:37    


 


Hypochromasia  Not Reportable   03/01/20  16:37    


 


Poikilocytosis  Not Reportable   03/01/20  16:37    


 


Anisocytosis  Few   03/01/20  16:37    


 


Microcytosis  Not Reportable   03/01/20  16:37    


 


Macrocytosis  Not Reportable   03/01/20  16:37    


 


Spherocytes  Not Reportable   03/01/20  16:37    


 


Pappenheimer Bodies  Not Reportable   03/01/20  16:37    


 


Sickle Cells  Not Reportable   03/01/20  16:37    


 


Target Cells  Not Reportable   03/01/20  16:37    


 


Tear Drop Cells  Not Reportable   03/01/20  16:37    


 


Ovalocytes  Not Reportable   03/01/20  16:37    


 


Stomatocytes  Few   03/01/20  16:37    


 


Helmet Cells  Not Reportable   03/01/20  16:37    


 


B[place] Bodies  Not Reportable   03/01/20  16:37    


 


Cabot Rings  Not Reportable   03/01/20  16:37    


 


Luigi Cells  Not Reportable   03/01/20  16:37    


 


Bite Cells  Not Reportable   03/01/20  16:37    


 


Crenated Cell  Not Reportable   03/01/20  16:37    


 


Elliptocytes  Not Reportable   03/01/20  16:37    


 


Acanthocytes (Spur)  Not Reportable   03/01/20  16:37    


 


Rouleaux  Not Reportable   03/01/20  16:37    


 


Hemoglobin C Crystals  Not Reportable   03/01/20  16:37    


 


Schistocytes  Not Reportable   03/01/20  16:37    


 


Malaria parasites  Not Reportable   03/01/20  16:37    


 


Kyaw Bodies  Not Reportable   03/01/20  16:37    


 


Hem Pathologist Commnt  No   03/01/20  16:37    


 


VBG pH  7.088  (7.320-7.420)  L*  03/01/20  16:37    


 


Sodium  133 mmol/L (137-145)  L  03/02/20  10:00    


 


Potassium  3.2 mmol/L (3.6-5.0)  L  03/02/20  10:00    


 


Chloride  104.5 mmol/L ()   03/02/20  10:00    


 


Carbon Dioxide  14 mmol/L (22-30)  L  03/02/20  10:00    


 


Anion Gap  18 mmol/L  03/02/20  10:00    


 


BUN  15 mg/dL (9-20)   03/02/20  10:00    


 


Creatinine  0.7 mg/dL (0.8-1.5)  L  03/02/20  10:00    


 


Estimated GFR  > 60 ml/min  03/02/20  10:00    


 


BUN/Creatinine Ratio  21 %  03/02/20  10:00    


 


Glucose  248 mg/dL ()  H  03/02/20  10:00    


 


POC Glucose  229  ()  H  03/02/20  09:45    


 


Hemoglobin A1c  11.1 % (4-6)  H  03/01/20  21:19    


 


Lactic Acid  2.60 mmol/L (0.7-2.0)  H*  03/01/20  Unknown


 


Calcium  8.9 mg/dL (8.4-10.2)   03/02/20  10:00    


 


Phosphorus  5.50 mg/dL (2.5-4.5)  H  03/01/20  Unknown


 


Magnesium  2.40 mg/dL (1.7-2.3)  H  03/01/20  Unknown


 


Total Bilirubin  0.80 mg/dL (0.1-1.2)   03/01/20  16:37    


 


Direct Bilirubin  0.3 mg/dL (0-0.2)  H  03/01/20  16:37    


 


Indirect Bilirubin  0.5 mg/dL  03/01/20  16:37    


 


AST  13 units/L (5-40)   03/01/20  16:37    


 


ALT  37 units/L (7-56)   03/01/20  16:37    


 


Alkaline Phosphatase  167 units/L ()  H  03/01/20  16:37    


 


Total Protein  8.7 g/dL (6.3-8.2)  H  03/01/20  16:37    


 


Albumin  5.4 g/dL (3.9-5)  H  03/01/20  16:37    


 


Albumin/Globulin Ratio  1.6 %  03/01/20  16:37    


 


Lipase  683 units/L (13-60)  H  03/01/20  16:37    


 


Urine Color  Straw  (Yellow)   03/01/20  20:00    


 


Urine Turbidity  Clear  (Clear)   03/01/20  20:00    


 


Urine pH  5.0  (5.0-7.0)   03/01/20  20:00    


 


Ur Specific Gravity  1.029  (1.003-1.030)   03/01/20  20:00    


 


Urine Protein  30 mg/dl mg/dL (Negative)   03/01/20  20:00    


 


Urine Glucose (UA)  >=500 mg/dL (Negative)   03/01/20  20:00    


 


Urine Ketones  80 mg/dL (Negative)   03/01/20  20:00    


 


Urine Blood  Sm  (Negative)   03/01/20  20:00    


 


Urine Nitrite  Neg  (Negative)   03/01/20  20:00    


 


Urine Bilirubin  Neg  (Negative)   03/01/20  20:00    


 


Urine Urobilinogen  < 2.0 mg/dL (<2.0)   03/01/20  20:00    


 


Ur Leukocyte Esterase  Neg  (Negative)   03/01/20  20:00    


 


Urine WBC (Auto)  1.0 /HPF (0.0-6.0)   03/01/20  20:00    


 


Urine RBC (Auto)  2.0 /HPF (0.0-6.0)   03/01/20  20:00    


 


Hyaline Casts  1 /LPF  03/01/20  20:00    


 


Urine Mucus  Few /HPF  03/01/20  20:00    














Active Medications





- Current Medications


Current Medications: 














Generic Name Dose Route Start Last Admin





  Trade Name Freq  PRN Reason Stop Dose Admin


 


Dextrose  0 ml  03/01/20 16:58 





  D50w (25gm) Syringe  IV  





  Q30MIN PRN  





  Hypoglycemia  





  Protocol  


 


Famotidine  20 mg  03/01/20 22:00  03/02/20 09:17





  Pepcid  IV   20 mg





  BID KAREN   Administration


 


Insulin Human Regular 100  100 mls @ 1 mls/hr  03/01/20 21:00  03/02/20 09:38





  units/ Sodium Chloride  IV   7 units/hr





  TITR KAREN   7 mls/hr





    Titration





  Protocol  





  1 UNITS/HR  


 


Potassium Chloride/Dextrose/Sod Cl  20 meq in 1,000 mls @ 150 mls/hr  03/02/20 

10:00  03/02/20 09:29





  D5w/0.45% Nacl/Kcl 20 Meq  IV   150 mls/hr





  AS DIRECT KAREN   Administration


 


Potassium Chloride  10 meq in 100 mls @ 100 mls/hr  03/02/20 10:00  03/02/20 

09:28





  Kcl 10meq/100ml  IV  03/02/20 13:59  100 mls/hr





  Q1H KAREN   Administration


 


Ketorolac Tromethamine  15 mg  03/01/20 20:50  03/02/20 08:21





  Toradol  IV  03/06/20 20:49  15 mg





  Q6H PRN   Administration





  Pain, Mild (1-3)  


 


Oxycodone/Acetaminophen  1 tab  03/01/20 20:50  03/01/20 22:14





  Percocet 5/325  PO   1 tab





  Q6H PRN   Administration





  Pain, Moderate (4-6)  


 


Sodium Chloride  10 ml  03/01/20 22:00  03/02/20 09:19





  Sodium Chloride Flush Syringe 10 Ml  IV   10 ml





  BID KAREN   Administration


 


Sodium Chloride  10 ml  03/01/20 20:50 





  Sodium Chloride Flush Syringe 10 Ml  IV  





  PRN PRN  





  LINE FLUSH

## 2020-03-03 VITALS — SYSTOLIC BLOOD PRESSURE: 113 MMHG | DIASTOLIC BLOOD PRESSURE: 56 MMHG

## 2020-03-03 RX ADMIN — INSULIN LISPRO SCH UNIT: 100 INJECTION, SOLUTION INTRAVENOUS; SUBCUTANEOUS at 12:48

## 2020-03-03 RX ADMIN — INSULIN LISPRO SCH UNIT: 100 INJECTION, SOLUTION INTRAVENOUS; SUBCUTANEOUS at 09:05

## 2020-03-03 RX ADMIN — FAMOTIDINE SCH MG: 10 INJECTION, SOLUTION INTRAVENOUS at 10:52

## 2020-03-03 RX ADMIN — Medication SCH ML: at 10:52

## 2020-03-03 RX ADMIN — INSULIN HUMAN SCH UNIT: 100 INJECTION, SUSPENSION SUBCUTANEOUS at 09:05

## 2020-03-03 NOTE — DISCHARGE SUMMARY
Providers





- Providers


Date of Admission: 


03/01/20 18:12





Attending physician: 


AMY MONGE MD





                                        





03/01/20 20:51


Consult to Dietitian/Nutrition [CONS] Routine 


   Physician Instructions: 


   Reason For Exam: DKA


   Reason for Consult: Nutrition Recommendations


   Reason for Consult: Diet education











Primary care physician: 


PRIMARY CARE MD








Hospitalization


Reason for admission: DKA


Condition: Stable


Hospital course: 


36-year-old male with a past medical history non-insulin-dependent diabetes 

presents to the hospital planing of hyperglycemia, nausea, vomiting, and 

generalized abdominal pain for last 3 days.  Patient has been diagnosed with 

diabetes 1 year ago and is from [place].  3 weeks ago he ran out of his 

previously prescribed medication from [place].  Yesterday he went to an urgent 

care clinic with complaints of nausea and vomiting.  He was told that his sugar 

was high and he had a UTI.  He was started on metformin 850 mg twice daily and 

an unknown antibiotic.  He was told if his nausea vomiting continued he would 

need to go to the ER.  Patient reports polyuria up until about 2 days ago..  

Denies previous history of DKA or ICU admission.  Patient complains of 

generalized burning pain with intermittent sharpness that is worse with 

palpation.  He denies diarrhea, fever, history of previous abd surgerieS





Patient was admitted to the ICU and started on DKA protocol with rapid 

improvement.  Is clinically stable at this time blood sugar did elevate mildly. 

 Following discussion with the patient we did recommend starting him on insulin 

Novolin affordable which she can  at [facility].  He was treated empirically

 with Rocephin although no no clear source of infection was identified this was 

felt to be reactive.  Hemoglobin A1c was noted at 11.1 extensive counseling was 

provided to the patient he verbalized understanding family is at bedside.  

Imaging studies were reviewed and were negative urinalysis was negative.





DKA


Uncontrolled diabetes mellitus


SIRS without organ dysfunction


Leukocytosis-reactive


Hyponatremia





Disposition: DC-01 TO HOME OR SELFCARE


Time spent for discharge: 35 mins





Core Measure Documentation





- Palliative Care


Palliative Care/ Comfort Measures: Not Applicable





- Core Measures


Any of the following diagnoses?: none





Exam





- Physical Exam


Narrative exam: 


VITAL SIGNS:  Reviewed.    


GENERAL:  The patient appears normally developed, Vital signs as documented.


HEAD:  No signs of head trauma.


EYES:  Pupils are equal.  Extraocular motions intact.  


EARS:  Hearing grossly intact.


MOUTH:  Oropharynx is normal. 


NECK:  No adenopathy, no JVD.  


CHEST:  Chest with clear breath sounds bilaterally.  No wheezes, rales, or 

rhonchi.  


CARDIAC:  Regular rate and rhythm.  S1 and S2, without murmurs, gallops, or 

rubs.


VASCULAR:  No Edema.  Peripheral pulses normal and equal in all extremities.


ABDOMEN:  Soft, non tender and non distended.  No   rebound or guarding, and no 

masses palpated.   Bowel Sounds normal.


MUSCULOSKELETAL:  Good range of motion of all major joints. Extremities without 

clubbing, cyanosis or edema.  


NEUROLOGIC EXAM:  Alert and oriented x 3   No focal sensory or strength 

deficits.   Speech normal.  Follows commands.


PSYCHIATRIC:  Mood normal.


SKIN:  detial exam as documented in skin assessment








- Constitutional


Vitals: 


                                        











Temp Pulse Resp BP Pulse Ox


 


 97.8 F   73   20   113/56   96 


 


 03/03/20 04:55  03/03/20 04:55  03/03/20 04:55  03/03/20 04:55  03/03/20 04:55














Plan


Activity: advance as tolerated, fall precautions


Diet: diabetic


Special Instructions: record daily weights, record daily BP diary, record blood 

sugar diary


Follow up with: 


[facility] [Outside] - 7 Days


Prescriptions: 


glipiZIDE [Glucotrol] 5 mg PO QDAY #30 tablet


Insulin NPH/Regular [Novolin 70/30] 20 unit SQ BIDDIAB 30 Days  ml


Other Discharge Orders: 


Glucometer  (Amb) Location: None Selected


Glucometer supplies[Amb] Location: None Selected

## 2022-06-17 ENCOUNTER — HOSPITAL ENCOUNTER (EMERGENCY)
Dept: HOSPITAL 5 - ED | Age: 37
LOS: 2 days | Discharge: LEFT BEFORE DISCHARGE | End: 2022-06-19
Payer: SELF-PAY

## 2022-06-17 VITALS — SYSTOLIC BLOOD PRESSURE: 99 MMHG | DIASTOLIC BLOOD PRESSURE: 69 MMHG

## 2022-06-17 DIAGNOSIS — Z53.21: ICD-10-CM

## 2022-06-17 DIAGNOSIS — R07.9: Primary | ICD-10-CM

## 2022-06-17 PROCEDURE — 71046 X-RAY EXAM CHEST 2 VIEWS: CPT

## 2022-06-17 NOTE — XRAY REPORT
CHEST 2 VIEWS 



INDICATION / CLINICAL INFORMATION: fall, injury.



COMPARISON: None available.



FINDINGS:



SUPPORT DEVICES: None.

HEART / MEDIASTINUM: No significant abnormality. 

LUNGS / PLEURA: No significant pulmonary or pleural abnormality. No pneumothorax. 



ADDITIONAL FINDINGS: No significant additional findings.



IMPRESSION:

1. No acute findings.



Signer Name: Thor Isidro MD 

Signed: 6/17/2022 3:34 PM

Workstation Name: [email or web address]